# Patient Record
Sex: FEMALE | Race: WHITE
[De-identification: names, ages, dates, MRNs, and addresses within clinical notes are randomized per-mention and may not be internally consistent; named-entity substitution may affect disease eponyms.]

---

## 2021-03-02 ENCOUNTER — HOSPITAL ENCOUNTER (OUTPATIENT)
Dept: HOSPITAL 53 - M RAD | Age: 41
End: 2021-03-02
Attending: OBSTETRICS & GYNECOLOGY
Payer: COMMERCIAL

## 2021-03-02 DIAGNOSIS — N85.00: Primary | ICD-10-CM

## 2021-03-02 NOTE — REP
INDICATION:

ENDOMETRIAL HYPERPLASIA



COMPARISON:

None.



TECHNIQUE:

Transabdominal pelvic ultrasound followed by transvaginal examination for better

evaluation of the endometrium and adnexa with color Doppler evaluation of the ovaries.



FINDINGS:

Bladder is unremarkable and measures 9.5 x 13.8 x 7.2 cm.



Heterogeneous anteverted uterus measures 11.0 x 5.2 x 5.3 cm.  The endometrial complex

measures 3.3 mm thickness.  Multiple nabothian cysts in the cervix are identified

measuring up to approximately 13 mm.



The right ovary is not visualized.  Left ovary measures 4.0 x 3.5 x 3.2 cm (RI 0.41)

and includes 2.5 x 3.1 x 2.3 cm presumed physiologic cyst.



No pelvic fluid or adnexal mass lesion appreciated.



IMPRESSION:

Nabothian cysts up to 13 mm.





<Electronically signed by Kunal Schilling > 03/02/21 [id number]

## 2021-03-16 ENCOUNTER — HOSPITAL ENCOUNTER (EMERGENCY)
Dept: HOSPITAL 53 - M ED | Age: 41
Discharge: HOME | End: 2021-03-16
Payer: COMMERCIAL

## 2021-03-16 VITALS — HEIGHT: 64 IN | WEIGHT: 293 LBS | BODY MASS INDEX: 50.02 KG/M2

## 2021-03-16 VITALS — OXYGEN SATURATION: 95 %

## 2021-03-16 VITALS — DIASTOLIC BLOOD PRESSURE: 61 MMHG | SYSTOLIC BLOOD PRESSURE: 155 MMHG

## 2021-03-16 DIAGNOSIS — R06.02: Primary | ICD-10-CM

## 2021-03-16 DIAGNOSIS — Z88.7: ICD-10-CM

## 2021-03-16 DIAGNOSIS — Z88.8: ICD-10-CM

## 2021-03-16 DIAGNOSIS — J45.909: ICD-10-CM

## 2021-03-16 DIAGNOSIS — R07.9: ICD-10-CM

## 2021-03-16 DIAGNOSIS — Z88.0: ICD-10-CM

## 2021-03-16 DIAGNOSIS — D64.9: ICD-10-CM

## 2021-03-16 LAB
ALBUMIN SERPL BCG-MCNC: 3 GM/DL (ref 3.2–5.2)
ALT SERPL W P-5'-P-CCNC: 46 U/L (ref 12–78)
BASOPHILS # BLD AUTO: 0.1 10^3/UL (ref 0–0.2)
BASOPHILS NFR BLD AUTO: 0.8 % (ref 0–1)
BILIRUB CONJ SERPL-MCNC: 0.1 MG/DL (ref 0–0.2)
BILIRUB SERPL-MCNC: 0.4 MG/DL (ref 0.2–1)
BUN SERPL-MCNC: 9 MG/DL (ref 7–18)
CALCIUM SERPL-MCNC: 8.5 MG/DL (ref 8.5–10.1)
CHLORIDE SERPL-SCNC: 102 MEQ/L (ref 98–107)
CK MB CFR.DF SERPL CALC: 0.4
CK MB SERPL-MCNC: < 1 NG/ML (ref ?–3.6)
CK SERPL-CCNC: 247 U/L (ref 26–192)
CO2 SERPL-SCNC: 34 MEQ/L (ref 21–32)
CREAT SERPL-MCNC: 0.53 MG/DL (ref 0.55–1.3)
EOSINOPHIL # BLD AUTO: 0.2 10^3/UL (ref 0–0.5)
EOSINOPHIL NFR BLD AUTO: 2 % (ref 0–3)
GFR SERPL CREATININE-BSD FRML MDRD: > 60 ML/MIN/{1.73_M2} (ref 58–?)
GLUCOSE SERPL-MCNC: 117 MG/DL (ref 70–100)
HCT VFR BLD AUTO: 41 % (ref 36–47)
HGB BLD-MCNC: 12.4 G/DL (ref 12–15.5)
LYMPHOCYTES # BLD AUTO: 2.8 10^3/UL (ref 1.5–5)
LYMPHOCYTES NFR BLD AUTO: 24.3 % (ref 24–44)
MCH RBC QN AUTO: 24.2 PG (ref 27–33)
MCHC RBC AUTO-ENTMCNC: 30.2 G/DL (ref 32–36.5)
MCV RBC AUTO: 79.9 FL (ref 80–96)
MONOCYTES # BLD AUTO: 1.1 10^3/UL (ref 0–0.8)
MONOCYTES NFR BLD AUTO: 9.6 % (ref 2–8)
NEUTROPHILS # BLD AUTO: 7.2 10^3/UL (ref 1.5–8.5)
NEUTROPHILS NFR BLD AUTO: 62.4 % (ref 36–66)
NT-PRO BNP: 14 PG/ML (ref ?–125)
PLATELET # BLD AUTO: 312 10^3/UL (ref 150–450)
POTASSIUM SERPL-SCNC: 3.8 MEQ/L (ref 3.5–5.1)
PROT SERPL-MCNC: 7.1 GM/DL (ref 6.4–8.2)
RBC # BLD AUTO: 5.13 10^6/UL (ref 4–5.4)
SODIUM SERPL-SCNC: 139 MEQ/L (ref 136–145)
TROPONIN I SERPL-MCNC: < 0.02 NG/ML (ref ?–0.1)
TSH SERPL DL<=0.005 MIU/L-ACNC: 1.44 UIU/ML (ref 0.36–3.74)
WBC # BLD AUTO: 11.6 10^3/UL (ref 4–10)

## 2021-03-16 PROCEDURE — 93005 ELECTROCARDIOGRAM TRACING: CPT

## 2021-03-16 PROCEDURE — 93970 EXTREMITY STUDY: CPT

## 2021-03-16 PROCEDURE — 83880 ASSAY OF NATRIURETIC PEPTIDE: CPT

## 2021-03-16 PROCEDURE — 93041 RHYTHM ECG TRACING: CPT

## 2021-03-16 PROCEDURE — 84443 ASSAY THYROID STIM HORMONE: CPT

## 2021-03-16 PROCEDURE — 36415 COLL VENOUS BLD VENIPUNCTURE: CPT

## 2021-03-16 PROCEDURE — 94640 AIRWAY INHALATION TREATMENT: CPT

## 2021-03-16 PROCEDURE — 87804 INFLUENZA ASSAY W/OPTIC: CPT

## 2021-03-16 PROCEDURE — 71275 CT ANGIOGRAPHY CHEST: CPT

## 2021-03-16 PROCEDURE — 80076 HEPATIC FUNCTION PANEL: CPT

## 2021-03-16 PROCEDURE — 82550 ASSAY OF CK (CPK): CPT

## 2021-03-16 PROCEDURE — 94760 N-INVAS EAR/PLS OXIMETRY 1: CPT

## 2021-03-16 PROCEDURE — 80048 BASIC METABOLIC PNL TOTAL CA: CPT

## 2021-03-16 PROCEDURE — 82553 CREATINE MB FRACTION: CPT

## 2021-03-16 PROCEDURE — 99284 EMERGENCY DEPT VISIT MOD MDM: CPT

## 2021-03-16 PROCEDURE — 85025 COMPLETE CBC W/AUTO DIFF WBC: CPT

## 2021-03-16 NOTE — REP
INDICATION:

pleuritic chest pain sob



COMPARISON:

None.



TECHNIQUE:

Axial contrast enhanced images from the thoracic inlet to the upper abdomen using

pulmonary embolus technique with multiplanar re-formations.  75 ml Isovue 370

intravenous contrast material administered without complication.



This CT examination was performed using the following dose reduction techniques:

Automated exposure control, adjustment of mA and/or kv according to the patient's

size, and use of iterative reconstruction technique.





FINDINGS:

Evaluation of the pulmonary vasculature is suboptimal due to motion artifact and body

habitus/technical factors.  No obvious main or 1st order pulmonary emboli are

identified.  The thoracic aorta is without aneurysm or dissection.  No cardiomegaly or

pericardial effusion noted.



The bilateral lung fields are relatively well aerated.  Very minimal basilar dependent

changes are suggested.  No consolidation, effusion, or pneumothorax.  Tracheobronchial

tree is patent.



Surrounding musculoskeletal structures are intact and without acute osseous

abnormality.  Limited upper abdomen demonstrates hepatomegaly and hepatosteatosis.



IMPRESSION:

1. Limited examination without obvious pulmonary embolus.

2. No significant mediastinal or pleuroparenchymal process appreciated.







<Electronically signed by Kunal Schilling > 03/16/21 [id number]

## 2021-03-16 NOTE — REP
INDICATION:

edema r/o DVT



COMPARISON:

None.



TECHNIQUE:

Gray scale and color Doppler evaluation right and left lower extremity using linear

high frequency transducer.



FINDINGS:

Ultrasound examination of the right and left lower extremity deep venous structures

from the common femoral vein to the popliteal vein demonstrates normal compressibility

flow and wave patterns in response to respiration and augmentation.  There is no

evidence for deep venous thrombosis.



IMPRESSION:

No evidence for deep venous thrombosis.





<Electronically signed by Kunal Schilling > 03/16/21 [id number]

## 2021-03-16 NOTE — ECGEPIP
[facility] - ED

                                       

                                       Test Date:    2021

Pat Name:     FAIZA BURCH           Department:   

Patient ID:   [id number]                 Room:         -

Gender:       Female                   Technician:   

:          1980               Requested By: SYD JUNIOR

Order Number: [id number]     Reading MD:   John Stark

                                 Measurements

Intervals                              Axis          

Rate:         82                       P:            28

VT:           150                      QRS:          -19

QRSD:         84                       T:            31

QT:           382                                    

QTc:          446                                    

                           Interpretive Statements

Normal sinus rhythm

Minimal voltage criteria for LVH, may be normal variant ( R in aVL )

POOR R WAVE PROGRESSION

NO PRIORS FOR COMPARISON

Electronically Signed on 3- 20:01:04 EDT by John Stark

## 2021-03-23 ENCOUNTER — HOSPITAL ENCOUNTER (EMERGENCY)
Dept: HOSPITAL 53 - M ED | Age: 41
Discharge: HOME | End: 2021-03-23
Payer: COMMERCIAL

## 2021-03-23 VITALS — OXYGEN SATURATION: 95 %

## 2021-03-23 VITALS — SYSTOLIC BLOOD PRESSURE: 146 MMHG | DIASTOLIC BLOOD PRESSURE: 68 MMHG

## 2021-03-23 VITALS — BODY MASS INDEX: 50.02 KG/M2 | HEIGHT: 64 IN | WEIGHT: 293 LBS

## 2021-03-23 DIAGNOSIS — R06.02: Primary | ICD-10-CM

## 2021-03-23 DIAGNOSIS — Z88.7: ICD-10-CM

## 2021-03-23 DIAGNOSIS — J45.909: ICD-10-CM

## 2021-03-23 DIAGNOSIS — Z88.0: ICD-10-CM

## 2021-03-23 DIAGNOSIS — D64.9: ICD-10-CM

## 2021-03-23 DIAGNOSIS — Z88.8: ICD-10-CM

## 2021-03-23 LAB
ALBUMIN SERPL BCG-MCNC: 2.8 GM/DL (ref 3.2–5.2)
ALT SERPL W P-5'-P-CCNC: 44 U/L (ref 12–78)
B-HCG SERPL QL: NEGATIVE
BASE EXCESS BLDA CALC-SCNC: 3.6 MMOL/L (ref -2–2)
BASOPHILS # BLD AUTO: 0.1 10^3/UL (ref 0–0.2)
BASOPHILS NFR BLD AUTO: 0.8 % (ref 0–1)
BILIRUB CONJ SERPL-MCNC: < 0.1 MG/DL (ref 0–0.2)
BILIRUB SERPL-MCNC: 0.2 MG/DL (ref 0.2–1)
BUN SERPL-MCNC: 13 MG/DL (ref 7–18)
CALCIUM SERPL-MCNC: 8.6 MG/DL (ref 8.5–10.1)
CHLORIDE SERPL-SCNC: 103 MEQ/L (ref 98–107)
CK MB CFR.DF SERPL CALC: 0.21
CK MB SERPL-MCNC: 1 NG/ML (ref ?–3.6)
CK SERPL-CCNC: 485 U/L (ref 26–192)
CO2 BLDA CALC-SCNC: 28.2 MEQ/L (ref 22–29)
CO2 SERPL-SCNC: 30 MEQ/L (ref 21–32)
CREAT SERPL-MCNC: 0.65 MG/DL (ref 0.55–1.3)
EOSINOPHIL # BLD AUTO: 0.2 10^3/UL (ref 0–0.5)
EOSINOPHIL NFR BLD AUTO: 1.6 % (ref 0–3)
GFR SERPL CREATININE-BSD FRML MDRD: > 60 ML/MIN/{1.73_M2} (ref 58–?)
GLUCOSE SERPL-MCNC: 186 MG/DL (ref 70–100)
HCO3 BLDA-SCNC: 27 MEQ/L (ref 22–26)
HCO3 STD BLDA-SCNC: 27.7 MEQ/L (ref 22–26)
HCT VFR BLD AUTO: 39.5 % (ref 36–47)
HGB BLD-MCNC: 12 G/DL (ref 12–15.5)
LYMPHOCYTES # BLD AUTO: 2.4 10^3/UL (ref 1.5–5)
LYMPHOCYTES NFR BLD AUTO: 23.7 % (ref 24–44)
MCH RBC QN AUTO: 24.4 PG (ref 27–33)
MCHC RBC AUTO-ENTMCNC: 30.4 G/DL (ref 32–36.5)
MCV RBC AUTO: 80.4 FL (ref 80–96)
MONOCYTES # BLD AUTO: 0.8 10^3/UL (ref 0–0.8)
MONOCYTES NFR BLD AUTO: 8.2 % (ref 2–8)
NEUTROPHILS # BLD AUTO: 6.7 10^3/UL (ref 1.5–8.5)
NEUTROPHILS NFR BLD AUTO: 65.1 % (ref 36–66)
NT-PRO BNP: 9 PG/ML (ref ?–125)
PCO2 BLDA: 36.7 MMHG (ref 35–45)
PH BLDA: 7.49 UNITS (ref 7.35–7.45)
PLATELET # BLD AUTO: 326 10^3/UL (ref 150–450)
PO2 BLDA: 109.7 MMHG (ref 75–100)
POTASSIUM SERPL-SCNC: 4.3 MEQ/L (ref 3.5–5.1)
PROT SERPL-MCNC: 7 GM/DL (ref 6.4–8.2)
RBC # BLD AUTO: 4.91 10^6/UL (ref 4–5.4)
SAO2 % BLDA: 98.5 % (ref 95–99)
SODIUM SERPL-SCNC: 139 MEQ/L (ref 136–145)
TROPONIN I SERPL-MCNC: < 0.02 NG/ML (ref ?–0.1)
WBC # BLD AUTO: 10.3 10^3/UL (ref 4–10)

## 2021-03-23 PROCEDURE — 82550 ASSAY OF CK (CPK): CPT

## 2021-03-23 PROCEDURE — 87798 DETECT AGENT NOS DNA AMP: CPT

## 2021-03-23 PROCEDURE — 82553 CREATINE MB FRACTION: CPT

## 2021-03-23 PROCEDURE — 71275 CT ANGIOGRAPHY CHEST: CPT

## 2021-03-23 PROCEDURE — 93005 ELECTROCARDIOGRAM TRACING: CPT

## 2021-03-23 PROCEDURE — 93970 EXTREMITY STUDY: CPT

## 2021-03-23 PROCEDURE — 85025 COMPLETE CBC W/AUTO DIFF WBC: CPT

## 2021-03-23 PROCEDURE — 82803 BLOOD GASES ANY COMBINATION: CPT

## 2021-03-23 PROCEDURE — 83880 ASSAY OF NATRIURETIC PEPTIDE: CPT

## 2021-03-23 PROCEDURE — 93041 RHYTHM ECG TRACING: CPT

## 2021-03-23 PROCEDURE — 99285 EMERGENCY DEPT VISIT HI MDM: CPT

## 2021-03-23 PROCEDURE — 36415 COLL VENOUS BLD VENIPUNCTURE: CPT

## 2021-03-23 PROCEDURE — 84703 CHORIONIC GONADOTROPIN ASSAY: CPT

## 2021-03-23 PROCEDURE — 36600 WITHDRAWAL OF ARTERIAL BLOOD: CPT

## 2021-03-23 PROCEDURE — 80076 HEPATIC FUNCTION PANEL: CPT

## 2021-03-23 PROCEDURE — 80048 BASIC METABOLIC PNL TOTAL CA: CPT

## 2021-03-23 NOTE — ECGEPIP
[facility] - ED

                                       

                                       Test Date:    2021

Pat Name:     FAIZA BURCH           Department:   

Patient ID:   [id number]                 Room:         -

Gender:       Female                   Technician:   isabel

:          1980               Requested By: PREM ESCOBAR

Order Number: [id number]     Reading MD:   John Stark

                                 Measurements

Intervals                              Axis          

Rate:         86                       P:            43

NM:           152                      QRS:          -16

QRSD:         82                       T:            28

QT:           376                                    

QTc:          449                                    

                           Interpretive Statements

Normal sinus rhythm

Minimal voltage criteria for LVH, may be normal variant ( R in aVL )

POOR R WAVE PROGRESSION

SIMILAR TO 3/16/21

Electronically Signed on 3- 20:18:06 EDT by John Stark

## 2021-03-23 NOTE — REP
INDICATION:

edema r/o DVT.



COMPARISON:

Comparison study March 16, 2021..



TECHNIQUE:

Bilateral lower extremity duplex venous ultrasound.



FINDINGS:

The deep veins are anechoic and fully compressible from the groin to the popliteal

fossa in the left and right lower extremity.  Color flow imaging is homogeneous.

Spectral Doppler interrogation demonstrates intact respiratory variation in flow and

normal manual augmentation of flow.  There is no evidence of deep vein thrombosis.



Exam quality is inhibited slightly by patient body habitus and swelling.



IMPRESSION:

Negative bilateral lower extremity duplex venous ultrasound.  No evidence of deep vein

thrombosis.







<Electronically signed by Jamari Devlin > 03/23/21 [id number]

## 2021-03-23 NOTE — CR.PDOC
General


Date of Consultation:  Mar 23, 2021


Referring Provider:  SYD PINO MD


Attending Physician:  Sanjuanita Gomez MD





Consultation


REASON FOR CONSULTATION/CHIEF COMPLAINT: SOB, cough 





HISTORY OF PRESENT ILLNESS: 


Patient is a 40-year-old female with past medical history of asthma, and by her 

mental allergies, morbid obesity who presented to [facility] emergency room with 

a chief complaint of increased shortness of breath and cough. The patient has 

been seen at multiple emergency rooms in the area including [facility] 

several weeks ago and most recently here several days ago for increased 

shortness of breath and congestion. The patient states her shortness of breath 

has been accompanied with bilateral lower extremity edema. Several weeks ago she

was diagnosed with sinusitis and placed on 3 different antibiotics along with a 

prednisone taper over one week. She states her per the prednisone helped however

the patient did not see improvement with the antibiotics. She has accompanied 

postnasal drip, scratchy throat, cough with associated pleuritic pain at times. 

She states she often falls asleep and wakes up with the feeling of not being 

able to breathe. Her family members have stated that she stops breathing during 

her sleep at times. She has been diagnosed by rheumatologist and states that she

has taken antihistamines in the past but recently they have been able to help. 

She's had multiple Covid tests over the past several weeks all of which would 

have come back negative. Today in the emergency room CTA was negative, lower 

extremity ultrasound is negative bilaterally. BNP was 9, troponin negative. ECG 

was normal sinus rhythm. She displayed no signs or symptoms of hypoxia. She had 

some lower extremity, nonpitting swelling which was chronic. No signs or 

symptoms of cellulitis in the subcutaneous tissue.





Upon further evaluation the patient [place] and has 

reactive airway disease, asthma for which she uses an inhaler and nebulizers 

frequently. She has not been able to get into see a pulmonary specialist but was

recently referred to one by her primary care provider. She is waiting on her 

primary care's office to reach out to the pulmonary specialist to make 

appointment. She also has allergies to animals, which we had a farm but it does 

not help. She uses her nebulizer and hand-held inhaler frequently. She's never 

had PFTs to be formally diagnosed with asthma or any other airway disease. She 

states she is never had a sleep study done. She admits to increased weight gain 

over the past several months. The patient denies any recent travel, fevers, 

chills, nausea, vomiting, productive cough, sick contacts.





ROS: neg except mentioned above 





ALLERGIES: Please see below.





HOME MEDICATIONS: Please see below.





PAST MEDICAL HISTORY:


Asthma


Environmental allergies


Morbid obesity





PAST SURGICAL HISTORY: 


None





FAMILY HISTORY:


Noncontributory





SOCIAL HISTORY:


Denies smoking, alcohol or drug use. [place] with [place]. Primary care 

provider she sees regularly and is currently referred to pulmonology. The 

patient is a full code.





PHYSICAL EXAMINATION: 


VS: please see below 


CONSTITUTIONAL: appears uncomfortable, slightly anxious,  AAO x 3


EYES: PERRLA, EOM intact


HENT, MOUTH: Normocephalic, atraumatic, moist mucous membranes, no erythema of 

throat 


NECK: SUPPLE, no JVD, no lymphadenopathy, no carotid bruit


CV: Regular rate and rhythm, S1S2 normal, no murmurs/rubs/gallops


RESPIRATORY: Clear to auscultation bilaterally, no rales/rhonchi/wheezes


GI:  BS positive in 4 quadrants, soft, nontender, nondistended, no rebound or 

guarding, no organomegaly


: Deferred


MUSCULOSKELETAL: Normal ROM. No cyanosis, clubbing, swelling, joint deformity, 

extremity edema


INTEGUMENTARY:  Intact, no rashes, no lesions, no erythema


NEUROLOGIC: Cranial Nerves II-XII are intact, no focal deficits


PSYCHIATRIC: Mood and affect are normal 





LABORATORY DATA: 


Please see below 





IMAGING: 


CTA chest: 


No CT evidence of pulmonary embolism.   No infiltrate seen





US b/l lower ext: 


Negative bilateral lower extremity duplex venous ultrasound.  No evidence of 

deep vein thrombosis.





ASSESSMENT: 41 y/o F with PMH of morbid obesity, environmental allergies, asthma

who we were consulted to see in ER for SOB likely multifactorial 2/2 to uncontr

olled environmental allergies, asthma and PRAFUL. 





PLAN:  





SOB likely multifactorial 2/2 to uncontrolled environmental allergies, asthma 

and PRAFUL


-No hypoxia during ER visit or wheezing, saturating well on RA


-All imaging neg for acute pathology 


-Recommend to call PCP after discharge, have them follow up on pulmonary 

referral for PFTs, sleep study, further evaluation. Also, if there are multiple 

environmental triggers for allergies and asthma around her, it may be necessary 

to get rid of those. She may benefit from allergist referral as well. 


-Recommend d/c with tapering dose of prednisone over 7 days, Zyrtec, close 

follow up with PCP, continued use of hand held rescue inhaler and nebulizer 

machine PRN 


-Due to likely diagnosis of PRAFUL, recommend sleeping in upright position if this 

allows more comfort 





Morbid obesity 


-Recommend lifestyle modifications, as this is likely cause of likely PRAFUL


-F/u with PCP 


-Discussed with patient


 


DISPOSITION: Thank you kindly for this consult. Recommend f/u with PCP, 

pulmonary o/p.





Vital Signs/I&O





Vital Signs








  Date Time  Temp Pulse Resp B/P (MAP) Pulse Ox O2 Delivery O2 Flow Rate FiO2


 


3/23/21 17:07     95 Room Air  


 


3/23/21 17:04  100      


 


3/23/21 17:00    143/69 (93)    


 


3/23/21 12:50 97.4  24     











Laboratory Data


Labs 24H


Laboratory Tests 2


3/23/21 13:21: 


Immature Granulocyte % (Auto) 0.6, Neutrophils (%) (Auto) 65.1, Lymphocytes (%) 

(Auto) 23.7L, Monocytes (%) (Auto) 8.2H, Eosinophils (%) (Auto) 1.6, Basophils 

(%) (Auto) 0.8, Neutrophils # (Auto) 6.7, Lymphocytes # (Auto) 2.4, Monocytes # 

(Auto) 0.8, Eosinophils # (Auto) 0.2, Basophils # (Auto) 0.1, Nucleated Red 

Blood Cells % (auto) 0.0, Anion Gap 6L, Glomerular Filtration Rate > 60.0, 

Calcium Level 8.6, Total Bilirubin 0.2, Direct Bilirubin < 0.1, Aspartate Amino 

Transf (AST/SGOT) 30, Alanine Aminotransferase (ALT/SGPT) 44, Alkaline 

Phosphatase 61, Total Creatine Kinase 485H, Creatine Kinase MB 1.0, Creatine 

Kinase MB Relative Index 0.21, Troponin I < 0.02, NT-Pro-B-Type Natriuretic 

Peptide 9, Total Protein 7.0, Albumin 2.8L, Albumin/Globulin Ratio 0.7L, Human 

Chorionic Gonadotropin, Qual NEGATIVE


3/23/21 15:25: 


Blood Gas Bicarbonate Standard 27.7H, Arterial Blood pH 7.485H, Arterial Blood 

Partial Pressure CO2 36.7, Arterial Blood Partial Pressure O2 109.7H, Arterial 

Blood Total CO2 28.2, Arterial Blood HCO3 27.0H, Arterial Blood Base Excess 

3.6H, Arterial Blood Oxygen Saturation 98.5


CBC/BMP


Laboratory Tests


3/23/21 13:21








Microbiology





Microbiology


3/23/21 Respiratory Virus Panel (PCR) ([facility]) - Final, Complete





Allergies


Coded Allergies:  


     Influenza Virus Vaccines (Verified  Allergy, Severe, sepsis, 3/16/21)


     amoxicillin (Verified  Allergy, Intermediate, blisters, 3/16/21)


     clavulanic acid (Verified  Allergy, Intermediate, blisters, 3/16/21)





Home Medications


Scheduled PRN


Albuterol Sulf (Albuterol Sulfate) 2.5 Mg/3 Ml Vial.neb, 2.5 MG INH for 

SHORTNESS OF BREATH, (Reported)


Albuterol Sulfate (Ventolin Hfa) 18 Gm Hfa.aer.ad, 2 PUFF INH Q4-6HP PRN for 

wheezing for 30 Days, #1 (Reported)











Sanjuanita Gomez MD            Mar 23, 2021 18:41

## 2021-03-23 NOTE — REP
INDICATION:

SOB.



COMPARISON:

03/16/2021.



TECHNIQUE:

CT angiogram chest performed following the intravenous administration of 100 cc of

Isovue 370.  Sagittal and coronal reconstruction images are performed.



FINDINGS:

Lungs: Clear, no infiltrate or nodule.

Mediastinum: No adenopathy.

Pulmonary arteries: No evidence of pulmonary embolism.

Lesa: No adenopathy.

Axilla: No adenopathy.

Pleura: No effusion.

Heart: Not enlarged.

Thoracic aorta: No aneurysm or dissection.

Upper abdominal structures: There is diffuse fatty infiltration of the liver..

Visualized osseous structures: There are mild degenerative changes of the spine

without compression deformity.



IMPRESSION:

No CT evidence of pulmonary embolism.   No infiltrate seen.





<Electronically signed by Mk Varela > 03/23/21 [id number]

## 2021-05-25 ENCOUNTER — HOSPITAL ENCOUNTER (OUTPATIENT)
Dept: HOSPITAL 53 - M PLAIMG | Age: 41
End: 2021-05-25
Attending: INTERNAL MEDICINE

## 2021-05-25 DIAGNOSIS — M50.322: Primary | ICD-10-CM

## 2021-05-25 DIAGNOSIS — M54.9: ICD-10-CM

## 2021-05-25 NOTE — REPPI
INDICATION:

DISABILITY DIAGNOSIS DETERMINATION, BACK PAIN.



COMPARISON:

None.



TECHNIQUE:

AP, lateral, swimmer's, and open mouth views of the cervical spine.



FINDINGS:

Alignment is maintained.  No acute fracture/compression injury or subluxation.

Minimal endplate sclerosis and disc space narrowing at C3-4 and C4-5.  Remainder of

the examination is essentially age-appropriate.



IMPRESSION:

Very minimal disc space narrowing at C3-4 and C4-5 suggested.

Otherwise age-appropriate examination.





<Electronically signed by Kunal Schilling > 05/25/21 [id number]

## 2022-11-17 ENCOUNTER — HOSPITAL ENCOUNTER (OUTPATIENT)
Dept: HOSPITAL 53 - M LAB REF | Age: 42
End: 2022-11-17
Attending: PHYSICIAN ASSISTANT
Payer: COMMERCIAL

## 2022-11-17 DIAGNOSIS — R09.81: ICD-10-CM

## 2022-11-17 DIAGNOSIS — R05.9: Primary | ICD-10-CM

## 2023-05-01 ENCOUNTER — HOSPITAL ENCOUNTER (EMERGENCY)
Dept: HOSPITAL 53 - M ED | Age: 43
Discharge: HOME | End: 2023-05-01
Payer: COMMERCIAL

## 2023-05-01 VITALS — DIASTOLIC BLOOD PRESSURE: 81 MMHG | SYSTOLIC BLOOD PRESSURE: 144 MMHG

## 2023-05-01 VITALS — BODY MASS INDEX: 50.02 KG/M2 | WEIGHT: 293 LBS | HEIGHT: 64 IN

## 2023-05-01 DIAGNOSIS — M54.50: Primary | ICD-10-CM

## 2023-05-01 DIAGNOSIS — Z88.7: ICD-10-CM

## 2023-05-01 DIAGNOSIS — Z79.899: ICD-10-CM

## 2023-05-01 DIAGNOSIS — Z88.1: ICD-10-CM

## 2023-05-01 DIAGNOSIS — Z79.51: ICD-10-CM

## 2023-05-01 LAB
BASOPHILS # BLD AUTO: 0.1 10^3/UL (ref 0–0.2)
BASOPHILS NFR BLD AUTO: 1 % (ref 0–1)
BUN SERPL-MCNC: 13 MG/DL (ref 9–23)
CALCIUM SERPL-MCNC: 8 MG/DL (ref 8.5–10.1)
CHLORIDE SERPL-SCNC: 105 MMOL/L (ref 98–107)
CO2 SERPL-SCNC: 27 MMOL/L (ref 20–31)
CREAT SERPL-MCNC: 0.58 MG/DL (ref 0.55–1.3)
CRP SERPL-MCNC: 3.5 MG/DL (ref ?–1)
EOSINOPHIL # BLD AUTO: 0.2 10^3/UL (ref 0–0.5)
EOSINOPHIL NFR BLD AUTO: 1.8 % (ref 0–3)
ERYTHROCYTE [SEDIMENTATION RATE] IN BLOOD BY WESTERGREN METHOD: 40 MM/HR (ref 0–20)
GFR SERPL CREATININE-BSD FRML MDRD: > 60 ML/MIN/{1.73_M2} (ref 58–?)
GLUCOSE SERPL-MCNC: 252 MG/DL (ref 60–100)
HCT VFR BLD AUTO: 39.4 % (ref 36–47)
HGB BLD-MCNC: 12.3 G/DL (ref 12–15.5)
LYMPHOCYTES # BLD AUTO: 2 10^3/UL (ref 1.5–5)
LYMPHOCYTES NFR BLD AUTO: 19.1 % (ref 24–44)
MCH RBC QN AUTO: 27.3 PG (ref 27–33)
MCHC RBC AUTO-ENTMCNC: 31.2 G/DL (ref 32–36.5)
MCV RBC AUTO: 87.4 FL (ref 80–96)
MONOCYTES # BLD AUTO: 0.7 10^3/UL (ref 0–0.8)
MONOCYTES NFR BLD AUTO: 6.7 % (ref 2–8)
NEUTROPHILS # BLD AUTO: 7.2 10^3/UL (ref 1.5–8.5)
NEUTROPHILS NFR BLD AUTO: 70.9 % (ref 36–66)
PLATELET # BLD AUTO: 262 10^3/UL (ref 150–450)
POTASSIUM SERPL-SCNC: 4.2 MMOL/L (ref 3.5–5.1)
RBC # BLD AUTO: 4.51 10^6/UL (ref 4–5.4)
SODIUM SERPL-SCNC: 137 MMOL/L (ref 136–145)
WBC # BLD AUTO: 10.2 10^3/UL (ref 4–10)

## 2023-05-01 PROCEDURE — 72128 CT CHEST SPINE W/O DYE: CPT

## 2023-05-01 PROCEDURE — 85652 RBC SED RATE AUTOMATED: CPT

## 2023-05-01 PROCEDURE — 87088 URINE BACTERIA CULTURE: CPT

## 2023-05-01 PROCEDURE — 80048 BASIC METABOLIC PNL TOTAL CA: CPT

## 2023-05-01 PROCEDURE — 72131 CT LUMBAR SPINE W/O DYE: CPT

## 2023-05-01 PROCEDURE — 81001 URINALYSIS AUTO W/SCOPE: CPT

## 2023-05-01 PROCEDURE — 87186 SC STD MICRODIL/AGAR DIL: CPT

## 2023-05-01 PROCEDURE — 85025 COMPLETE CBC W/AUTO DIFF WBC: CPT

## 2023-05-01 PROCEDURE — 99283 EMERGENCY DEPT VISIT LOW MDM: CPT

## 2023-05-01 PROCEDURE — 86140 C-REACTIVE PROTEIN: CPT

## 2023-05-01 PROCEDURE — 96372 THER/PROPH/DIAG INJ SC/IM: CPT
